# Patient Record
Sex: FEMALE | URBAN - METROPOLITAN AREA
[De-identification: names, ages, dates, MRNs, and addresses within clinical notes are randomized per-mention and may not be internally consistent; named-entity substitution may affect disease eponyms.]

---

## 2023-07-19 ENCOUNTER — OFFICE VISIT (OUTPATIENT)
Dept: URBAN - METROPOLITAN AREA CLINIC 113 | Facility: CLINIC | Age: 78
End: 2023-07-19

## 2023-07-19 NOTE — HPI-TODAY'S VISIT:
77-year-old female is referred by Dr. Kenia Emerson for cholangiocarcinoma and cirrhosis of the liver.  A copy of today's visit will be forwarded to the referring provider.  Per the referral note, she has a history of cholangiocarcinoma, hepatitis B and cirrhosis of the liver.  She was due for surveillance MRI.  Labs 2/10/2023: Normal H/H, low platelets 142, glucose 171, creatinine 1.04, GFR 55, normal LFTs, unremarkable lipid panel, normal iron panel, normal serum ferritin, elevated TSH 6.970, normal free T4.  MRI of the liver with and without contrast 7/16/2023:Within hepatic segment VIII, there is delayed enhancement with associated capsular retraction as well as distal ductal dilation.  CBD measures 11 mm without enhancement.  Findings most likely represent sequela of a chronic infectious/inflammatory process with underlying fibrotic change.  Underlying cholangiocarcinoma is not entirely excluded and correlation with outside imaging would be of benefit.  MRI of the abdomen with IV contrast is recommended in 3 to 6 months.  Suspected clot within the right portal vein supplying hepatic segment V.  Cirrhotic hepatic morphology with trace abdominopelvic ascites.  MRCP- decide on ERCP (if obstruction in biliary tree) vs EUS (if liver mass or pnacreatic - EUS) CA 19-9, CEA, AFP, repeat CBC, CMP, PT/INR

## 2023-07-21 ENCOUNTER — TELEPHONE ENCOUNTER (OUTPATIENT)
Dept: URBAN - METROPOLITAN AREA CLINIC 113 | Facility: CLINIC | Age: 78
End: 2023-07-21

## 2023-07-25 ENCOUNTER — OFFICE VISIT (OUTPATIENT)
Dept: URBAN - METROPOLITAN AREA CLINIC 113 | Facility: CLINIC | Age: 78
End: 2023-07-25
Payer: COMMERCIAL

## 2023-07-25 ENCOUNTER — TELEPHONE ENCOUNTER (OUTPATIENT)
Dept: URBAN - METROPOLITAN AREA CLINIC 113 | Facility: CLINIC | Age: 78
End: 2023-07-25

## 2023-07-25 ENCOUNTER — WEB ENCOUNTER (OUTPATIENT)
Dept: URBAN - METROPOLITAN AREA CLINIC 113 | Facility: CLINIC | Age: 78
End: 2023-07-25

## 2023-07-25 VITALS
DIASTOLIC BLOOD PRESSURE: 80 MMHG | SYSTOLIC BLOOD PRESSURE: 135 MMHG | RESPIRATION RATE: 14 BRPM | BODY MASS INDEX: 29.26 KG/M2 | HEIGHT: 62 IN | HEART RATE: 101 BPM | TEMPERATURE: 97.3 F | WEIGHT: 159 LBS

## 2023-07-25 DIAGNOSIS — K74.69 OTHER CIRRHOSIS OF LIVER: ICD-10-CM

## 2023-07-25 DIAGNOSIS — B18.1 CHRONIC VIRAL HEPATITIS B WITHOUT DELTA AGENT AND WITHOUT COMA: ICD-10-CM

## 2023-07-25 DIAGNOSIS — R93.89 ABNORMAL MRI: ICD-10-CM

## 2023-07-25 PROBLEM — 169083003: Status: ACTIVE | Noted: 2023-07-25

## 2023-07-25 PROBLEM — 19943007: Status: ACTIVE | Noted: 2023-07-25

## 2023-07-25 PROBLEM — 61977001: Status: ACTIVE | Noted: 2023-07-25

## 2023-07-25 PROCEDURE — 99204 OFFICE O/P NEW MOD 45 MIN: CPT | Performed by: STUDENT IN AN ORGANIZED HEALTH CARE EDUCATION/TRAINING PROGRAM

## 2023-07-25 RX ORDER — LINAGLIPTIN 5 MG/1
1 TABLET TABLET, FILM COATED ORAL ONCE A DAY
Status: ACTIVE | COMMUNITY

## 2023-07-25 RX ORDER — TENOFOVIR ALAFENAMIDE 25 MG/1
1 TABLET WITH FOOD TABLET ORAL ONCE A DAY
Status: ACTIVE | COMMUNITY

## 2023-07-25 RX ORDER — TRAZODONE HYDROCHLORIDE 50 MG/1
1 TABLET AT BEDTIME AS NEEDED TABLET ORAL ONCE A DAY
Status: ACTIVE | COMMUNITY

## 2023-07-25 RX ORDER — MULTIVITAMIN
1 TABLET TABLET ORAL ONCE A DAY
Status: ACTIVE | COMMUNITY

## 2023-07-25 RX ORDER — LOSARTAN POTASSIUM 50 MG/1
1 TABLET TABLET ORAL ONCE A DAY
Status: ACTIVE | COMMUNITY

## 2023-07-25 RX ORDER — NADOLOL 20 MG/1
2 TABLETS TABLET ORAL ONCE A DAY
Status: ACTIVE | COMMUNITY

## 2023-07-25 RX ORDER — GLIPIZIDE 5 MG/1
1 TABLET 30 MINUTES BEFORE BREAKFAST TABLET ORAL ONCE A DAY
Status: ACTIVE | COMMUNITY

## 2023-07-25 RX ORDER — CITALOPRAM 10 MG/1
1 TABLET TABLET, FILM COATED ORAL ONCE A DAY
Status: ACTIVE | COMMUNITY

## 2023-07-25 RX ORDER — OMEPRAZOLE 40 MG/1
1 CAPSULE 30 MINUTES BEFORE MORNING MEAL CAPSULE, DELAYED RELEASE ORAL ONCE A DAY
Status: ACTIVE | COMMUNITY

## 2023-07-25 RX ORDER — NADOLOL 20 MG/1
2 TABLETS TABLET ORAL ONCE A DAY
Qty: 180 TABLET | Refills: 1

## 2023-07-25 RX ORDER — VIT A/VIT C/VIT E/ZINC/COPPER 4296-226
AS DIRECTED CAPSULE ORAL
Status: ACTIVE | COMMUNITY

## 2023-07-25 RX ORDER — TENOFOVIR ALAFENAMIDE 25 MG/1
1 TABLET WITH FOOD TABLET ORAL ONCE A DAY
Qty: 90 TABLET | Refills: 1

## 2023-07-25 RX ORDER — SACCHAROMYCES BOULARDII 250 MG
AS DIRECTED CAPSULE ORAL
Status: ACTIVE | COMMUNITY

## 2023-07-25 NOTE — HPI-TODAY'S VISIT:
Initial visit 7/25/2023; PCP Dr. Kenia Emerson Ms. 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 MRI liver with and without contrast 7/16/2023 for history of liver tumor, hepatitis B cirrhosis: Within hepatic segment 8 there is delayed enhancement with associated capsular retraction and distal ductal dilation, findings suggested to represent a sequela of chronic infectious/inflammatory process with underlying fibrotic change however cholangiocarcinoma was not entirely excluded, there is suspected clot within the right portal vein supplying hepatic segment 5, cirrhotic hepatic morphology was seen with trace abdominal pelvic ascites.  Repeat MRI recommended in 3 to 6-month interval. Labs 7/19/2023: BUN 13, creatinine 0.92, total bilirubin 0.7, alkaline phosphatase 114, AST 31, ALT 26, A1c 8.2%. She takes Vemlidy 25mg daily for hepatitis B. Patient presents today with her , they recently moved from [facility]. She states she was diagnosed with hepatitis B approximately 20 years ago and at that time was hospitalized for what appears to be decompensated liver disease. She has been following with GI prior to moving here. She states that she developed cirrhosis from hepatitis B and has done well on tenofovir therapy. She denies encephalopathy, ascites, or variceal bleeding. She had a liver tumor that was treated with radiation and ablation and treatment was successful. She notes an EGD done > 1 years ago. She states that she had a colonoscopy 5 years ago notabble for the removal of polyps. She has remains on nadolol for primary variceal bleeding prophylaxis. She denies fevers/chills, nausea/vomiting, abdominal pain, weight loss, rectal bleeding.

## 2023-07-28 LAB
A/G RATIO: 1.5
ABSOLUTE BASOPHILS: 22
ABSOLUTE EOSINOPHILS: 110
ABSOLUTE LYMPHOCYTES: 713
ABSOLUTE MONOCYTES: 282
ABSOLUTE NEUTROPHILS: 3274
ALBUMIN: 4.3
ALKALINE PHOSPHATASE: 94
ALT (SGPT): 26
AST (SGOT): 28
BASOPHILS: 0.5
BILIRUBIN, TOTAL: 1
BUN/CREATININE RATIO: (no result)
BUN: 12
CALCIUM: 9.7
CARBON DIOXIDE, TOTAL: 23
CHLORIDE: 101
CREATININE: 0.98
EGFR: 59
EOSINOPHILS: 2.5
GLOBULIN, TOTAL: 2.8
GLUCOSE: 272
HEMATOCRIT: 41.4
HEMOGLOBIN: 13.5
HEPATITIS B CORE AB TOTAL: REACTIVE
HEPATITIS B SURFACE AB IMMUNITY, QN: <5
HEPATITIS B SURFACE ANTIGEN: REACTIVE
HEPATITIS B VIRUS DNA: NOT DETECTED
HEPATITIS B VIRUS DNA: NOT DETECTED
INR: 1.1
LYMPHOCYTES: 16.2
MCH: 33.6
MCHC: 32.6
MCV: 103
MONOCYTES: 6.4
MPV: 9.4
NEUTROPHILS: 74.4
PLATELET COUNT: 99
POTASSIUM: 4
PROTEIN, TOTAL: 7.1
PT: 11.7
RDW: 12.9
RED BLOOD CELL COUNT: 4.02
SODIUM: 137
WHITE BLOOD CELL COUNT: 4.4

## 2023-08-02 ENCOUNTER — OFFICE VISIT (OUTPATIENT)
Dept: URBAN - METROPOLITAN AREA MEDICAL CENTER 19 | Facility: MEDICAL CENTER | Age: 78
End: 2023-08-02
Payer: COMMERCIAL

## 2023-08-02 DIAGNOSIS — K31.89 ACQUIRED DEFORMITY OF DUODENUM: ICD-10-CM

## 2023-08-02 DIAGNOSIS — I85.10 ESOPH VARICE OTHER DIS: ICD-10-CM

## 2023-08-02 DIAGNOSIS — I86.4 BLEEDING GASTRIC VARICES: ICD-10-CM

## 2023-08-02 DIAGNOSIS — K74.60 ADVANCED CIRRHOSIS: ICD-10-CM

## 2023-08-02 DIAGNOSIS — K76.6 CLINICALLY SIGNIFICANT PORTAL HYPERTENSION: ICD-10-CM

## 2023-08-02 PROCEDURE — 43235 EGD DIAGNOSTIC BRUSH WASH: CPT | Performed by: STUDENT IN AN ORGANIZED HEALTH CARE EDUCATION/TRAINING PROGRAM

## 2023-08-02 RX ORDER — NADOLOL 20 MG/1
2 TABLETS TABLET ORAL ONCE A DAY
Qty: 180 TABLET | Refills: 1 | Status: ACTIVE | COMMUNITY

## 2023-08-02 RX ORDER — SACCHAROMYCES BOULARDII 250 MG
AS DIRECTED CAPSULE ORAL
Status: ACTIVE | COMMUNITY

## 2023-08-02 RX ORDER — VIT A/VIT C/VIT E/ZINC/COPPER 4296-226
AS DIRECTED CAPSULE ORAL
Status: ACTIVE | COMMUNITY

## 2023-08-02 RX ORDER — LINAGLIPTIN 5 MG/1
1 TABLET TABLET, FILM COATED ORAL ONCE A DAY
Status: ACTIVE | COMMUNITY

## 2023-08-02 RX ORDER — TRAZODONE HYDROCHLORIDE 50 MG/1
1 TABLET AT BEDTIME AS NEEDED TABLET ORAL ONCE A DAY
Status: ACTIVE | COMMUNITY

## 2023-08-02 RX ORDER — TENOFOVIR ALAFENAMIDE 25 MG/1
1 TABLET WITH FOOD TABLET ORAL ONCE A DAY
Qty: 90 TABLET | Refills: 1 | Status: ACTIVE | COMMUNITY

## 2023-08-02 RX ORDER — MULTIVITAMIN
1 TABLET TABLET ORAL ONCE A DAY
Status: ACTIVE | COMMUNITY

## 2023-08-02 RX ORDER — OMEPRAZOLE 40 MG/1
1 CAPSULE 30 MINUTES BEFORE MORNING MEAL CAPSULE, DELAYED RELEASE ORAL ONCE A DAY
Status: ACTIVE | COMMUNITY

## 2023-08-02 RX ORDER — CITALOPRAM 10 MG/1
1 TABLET TABLET, FILM COATED ORAL ONCE A DAY
Status: ACTIVE | COMMUNITY

## 2023-08-02 RX ORDER — LOSARTAN POTASSIUM 50 MG/1
1 TABLET TABLET ORAL ONCE A DAY
Status: ACTIVE | COMMUNITY

## 2023-08-02 RX ORDER — GLIPIZIDE 5 MG/1
1 TABLET 30 MINUTES BEFORE BREAKFAST TABLET ORAL ONCE A DAY
Status: ACTIVE | COMMUNITY

## 2024-01-18 ENCOUNTER — ERX REFILL RESPONSE (OUTPATIENT)
Dept: URBAN - METROPOLITAN AREA CLINIC 113 | Facility: CLINIC | Age: 79
End: 2024-01-18

## 2024-01-18 ENCOUNTER — TELEPHONE ENCOUNTER (OUTPATIENT)
Dept: URBAN - METROPOLITAN AREA CLINIC 113 | Facility: CLINIC | Age: 79
End: 2024-01-18

## 2024-01-18 RX ORDER — NADOLOL 20 MG/1
2 TABLETS TABLET ORAL ONCE A DAY
Qty: 180 TABLET | Refills: 1 | OUTPATIENT

## 2024-02-21 ENCOUNTER — OV EP (OUTPATIENT)
Dept: URBAN - METROPOLITAN AREA CLINIC 113 | Facility: CLINIC | Age: 79
End: 2024-02-21
Payer: MEDICARE

## 2024-02-21 VITALS
SYSTOLIC BLOOD PRESSURE: 119 MMHG | HEIGHT: 62 IN | RESPIRATION RATE: 20 BRPM | DIASTOLIC BLOOD PRESSURE: 72 MMHG | WEIGHT: 144.6 LBS | TEMPERATURE: 96.9 F | BODY MASS INDEX: 26.61 KG/M2 | HEART RATE: 66 BPM

## 2024-02-21 DIAGNOSIS — C22.1 CHOLANGIOCARCINOMA: ICD-10-CM

## 2024-02-21 DIAGNOSIS — I81 PORTAL VEIN THROMBOSIS: ICD-10-CM

## 2024-02-21 DIAGNOSIS — B18.1 CHRONIC VIRAL HEPATITIS B WITHOUT DELTA AGENT AND WITHOUT COMA: ICD-10-CM

## 2024-02-21 DIAGNOSIS — K74.69 OTHER CIRRHOSIS OF LIVER: ICD-10-CM

## 2024-02-21 PROBLEM — 312104005: Status: ACTIVE | Noted: 2024-02-21

## 2024-02-21 PROCEDURE — 99214 OFFICE O/P EST MOD 30 MIN: CPT | Performed by: STUDENT IN AN ORGANIZED HEALTH CARE EDUCATION/TRAINING PROGRAM

## 2024-02-21 RX ORDER — TENOFOVIR ALAFENAMIDE 25 MG/1
1 TABLET WITH FOOD TABLET ORAL ONCE A DAY
Qty: 90 TABLET | Refills: 1 | Status: ACTIVE | COMMUNITY

## 2024-02-21 RX ORDER — GLIPIZIDE 5 MG/1
1 TABLET 30 MINUTES BEFORE BREAKFAST TABLET ORAL ONCE A DAY
Status: ACTIVE | COMMUNITY

## 2024-02-21 RX ORDER — OMEPRAZOLE 40 MG/1
1 CAPSULE 30 MINUTES BEFORE MORNING MEAL CAPSULE, DELAYED RELEASE ORAL ONCE A DAY
Status: ACTIVE | COMMUNITY

## 2024-02-21 RX ORDER — VIT A/VIT C/VIT E/ZINC/COPPER 4296-226
AS DIRECTED CAPSULE ORAL
Status: ACTIVE | COMMUNITY

## 2024-02-21 RX ORDER — LOSARTAN POTASSIUM 50 MG/1
1 TABLET TABLET ORAL ONCE A DAY
Status: ACTIVE | COMMUNITY

## 2024-02-21 RX ORDER — SACCHAROMYCES BOULARDII 250 MG
AS DIRECTED CAPSULE ORAL
Status: ACTIVE | COMMUNITY

## 2024-02-21 RX ORDER — NADOLOL 20 MG/1
2 TABLETS TABLET ORAL ONCE A DAY
Qty: 180 TABLET | Refills: 1 | Status: ACTIVE | COMMUNITY

## 2024-02-21 RX ORDER — TRAZODONE HYDROCHLORIDE 50 MG/1
1 TABLET AT BEDTIME AS NEEDED TABLET ORAL ONCE A DAY
Status: ACTIVE | COMMUNITY

## 2024-02-21 RX ORDER — LINAGLIPTIN 5 MG/1
1 TABLET TABLET, FILM COATED ORAL ONCE A DAY
Status: ACTIVE | COMMUNITY

## 2024-02-21 RX ORDER — TENOFOVIR ALAFENAMIDE 25 MG/1
1 TABLET WITH FOOD TABLET ORAL ONCE A DAY
Qty: 90 TABLET | Refills: 3

## 2024-02-21 RX ORDER — CITALOPRAM 10 MG/1
1 TABLET TABLET, FILM COATED ORAL ONCE A DAY
Status: ACTIVE | COMMUNITY

## 2024-02-21 RX ORDER — MULTIVITAMIN
1 TABLET TABLET ORAL ONCE A DAY
Status: ON HOLD | COMMUNITY

## 2024-02-21 NOTE — HPI-TODAY'S VISIT:
Initial visit 7/25/2023; PCP Dr. Kenia Emerson Ms. 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 Labs 2/10/2023: WBC 5.4, hemoglobin 14.4, platelet 142, BUN 17, creatinine 1.04, sodium 142, total bilirubin 0.8, alkaline phosphatase 95, AST 25, ALT 17, albumin 4.2. MRI liver with and without contrast 7/16/2023 for history of liver tumor, hepatitis B cirrhosis: Within hepatic segment 8 there is delayed enhancement with associated capsular retraction and distal ductal dilation, findings suggested to represent a sequela of chronic infectious/inflammatory process with underlying fibrotic change however cholangiocarcinoma was not entirely excluded, there is suspected clot within the right portal vein supplying hepatic segment 5, cirrhotic hepatic morphology was seen with trace abdominal pelvic ascites.  Repeat MRI recommended in 3 to 6-month interval. Labs 7/19/2023: BUN 13, creatinine 0.92, total bilirubin 0.7, alkaline phosphatase 114, AST 31, ALT 26, A1c 8.2%. She takes Vemlidy 25mg daily for hepatitis B. Patient presents today with her , they recently moved from [facility]. She states she was diagnosed with hepatitis B approximately 20 years ago and at that time was hospitalized for what appears to be decompensated liver disease. She has been following with GI prior to moving here. She states that she developed cirrhosis from hepatitis B and has done well on tenofovir therapy. She denies encephalopathy, ascites, or variceal bleeding. She had a liver tumor that was treated with radiation and ablation and treatment was successful. She notes an EGD done > 1 years ago. She states that she had a colonoscopy 5 years ago notabble for the removal of polyps. She has remains on nadolol for primary variceal bleeding prophylaxis. She denies fevers/chills, nausea/vomiting, abdominal pain, weight loss, rectal bleeding. . Follow-up visit 2/21/2024 MRI 12/22/2023 performed for history of cholangiocarcinoma/HCC: Signal abnormality within the hepatic segment 8 favored to be scarring/fibrosis, findings stable compared to prior exam, there is similar blooming artifact suspicious for thrombosis of the right portal vein this is stable compared to prior exam, cirrhotic hepatic morphology, splenorenal shunt in the left upper quadrant appreciated, no ascites. EGD 8/2/2023 performed for variceal screening: Grade 1 esophageal varices noted in the distal esophagus, type I isolated gastric varices found in the fundus without bleeding, portal hypertensive gastropathy seen, normal-appearing duodenum. Labs 1/31/2024: BUN 13, creatinine 1.04, liver enzymes unremarkable, LDL was 135, A1c 7.3%, CA 19-9 36.4 [within normal limits]. She states that she has pain in the right flank that is worse with movment. She denies abdominal pain today. She has been adherent to her Vemlidy regimen. She denies GI bleeding, confusion, abdominal distention, diarrhea/constipation.

## 2024-02-24 LAB
HEP BE AB: REACTIVE
HEP BE AG: (no result)
HEPATITIS B CORE AB TOTAL: REACTIVE
HEPATITIS B SURFACE ANTIBODY QL: (no result)
HEPATITIS B SURFACE ANTIGEN: REACTIVE
HEPATITIS B VIRUS DNA: NOT DETECTED
HEPATITIS B VIRUS DNA: NOT DETECTED

## 2024-06-01 ENCOUNTER — LAB OUTSIDE AN ENCOUNTER (OUTPATIENT)
Dept: URBAN - METROPOLITAN AREA CLINIC 113 | Facility: CLINIC | Age: 79
End: 2024-06-01

## 2024-06-24 ENCOUNTER — TELEPHONE ENCOUNTER (OUTPATIENT)
Dept: URBAN - METROPOLITAN AREA CLINIC 113 | Facility: CLINIC | Age: 79
End: 2024-06-24

## 2024-12-27 ENCOUNTER — DASHBOARD ENCOUNTERS (OUTPATIENT)
Age: 79
End: 2024-12-27

## 2024-12-27 ENCOUNTER — OFFICE VISIT (OUTPATIENT)
Dept: URBAN - METROPOLITAN AREA CLINIC 113 | Facility: CLINIC | Age: 79
End: 2024-12-27
Payer: MEDICARE

## 2024-12-27 VITALS
TEMPERATURE: 98.1 F | WEIGHT: 147.2 LBS | RESPIRATION RATE: 16 BRPM | BODY MASS INDEX: 27.09 KG/M2 | HEIGHT: 62 IN | DIASTOLIC BLOOD PRESSURE: 78 MMHG | SYSTOLIC BLOOD PRESSURE: 108 MMHG | HEART RATE: 76 BPM

## 2024-12-27 DIAGNOSIS — I81 PORTAL VEIN THROMBOSIS: ICD-10-CM

## 2024-12-27 DIAGNOSIS — C22.1 CHOLANGIOCARCINOMA: ICD-10-CM

## 2024-12-27 DIAGNOSIS — B18.1 CHRONIC VIRAL HEPATITIS B WITHOUT DELTA AGENT AND WITHOUT COMA: ICD-10-CM

## 2024-12-27 DIAGNOSIS — K74.69 OTHER CIRRHOSIS OF LIVER: ICD-10-CM

## 2024-12-27 PROCEDURE — 99214 OFFICE O/P EST MOD 30 MIN: CPT | Performed by: INTERNAL MEDICINE

## 2024-12-27 RX ORDER — SACCHAROMYCES BOULARDII 250 MG
AS DIRECTED CAPSULE ORAL
Status: ACTIVE | COMMUNITY

## 2024-12-27 RX ORDER — NADOLOL 20 MG/1
2 TABLETS TABLET ORAL ONCE A DAY
Qty: 180 TABLET | Refills: 1 | Status: ACTIVE | COMMUNITY

## 2024-12-27 RX ORDER — LINAGLIPTIN 5 MG/1
1 TABLET TABLET, FILM COATED ORAL ONCE A DAY
Status: ACTIVE | COMMUNITY

## 2024-12-27 RX ORDER — GLIPIZIDE 5 MG/1
1 TABLET 30 MINUTES BEFORE BREAKFAST TABLET ORAL ONCE A DAY
Status: ACTIVE | COMMUNITY

## 2024-12-27 RX ORDER — TRAZODONE HYDROCHLORIDE 50 MG/1
1 TABLET AT BEDTIME AS NEEDED TABLET ORAL ONCE A DAY
Status: ACTIVE | COMMUNITY

## 2024-12-27 RX ORDER — TENOFOVIR ALAFENAMIDE 25 MG/1
1 TABLET WITH FOOD TABLET ORAL ONCE A DAY
Qty: 90 TABLET | Refills: 3 | Status: ACTIVE | COMMUNITY

## 2024-12-27 RX ORDER — MULTIVITAMIN
1 TABLET TABLET ORAL ONCE A DAY
Status: ON HOLD | COMMUNITY

## 2024-12-27 RX ORDER — LEVOTHYROXINE SODIUM 0.07 MG/1
TABLET ORAL
Qty: 90 EACH | Refills: 0 | Status: ACTIVE | COMMUNITY

## 2024-12-27 RX ORDER — VIT A/VIT C/VIT E/ZINC/COPPER 4296-226
AS DIRECTED CAPSULE ORAL
Status: ACTIVE | COMMUNITY

## 2024-12-27 RX ORDER — LOSARTAN POTASSIUM 50 MG/1
1 TABLET TABLET ORAL ONCE A DAY
Status: ACTIVE | COMMUNITY

## 2024-12-27 RX ORDER — OMEPRAZOLE 40 MG/1
1 CAPSULE 30 MINUTES BEFORE MORNING MEAL CAPSULE, DELAYED RELEASE ORAL ONCE A DAY
Status: ON HOLD | COMMUNITY

## 2024-12-27 RX ORDER — CITALOPRAM 10 MG/1
1 TABLET TABLET, FILM COATED ORAL ONCE A DAY
Status: ACTIVE | COMMUNITY

## 2024-12-27 NOTE — HPI-TODAY'S VISIT:
Initial visit 7/25/2023; PCP Dr. Kenia Emerson Ms. 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 Labs 2/10/2023: WBC 5.4, hemoglobin 14.4, platelet 142, BUN 17, creatinine 1.04, sodium 142, total bilirubin 0.8, alkaline phosphatase 95, AST 25, ALT 17, albumin 4.2. MRI liver with and without contrast 7/16/2023 for history of liver tumor, hepatitis B cirrhosis: Within hepatic segment 8 there is delayed enhancement with associated capsular retraction and distal ductal dilation, findings suggested to represent a sequela of chronic infectious/inflammatory process with underlying fibrotic change however cholangiocarcinoma was not entirely excluded, there is suspected clot within the right portal vein supplying hepatic segment 5, cirrhotic hepatic morphology was seen with trace abdominal pelvic ascites.  Repeat MRI recommended in 3 to 6-month interval. Labs 7/19/2023: BUN 13, creatinine 0.92, total bilirubin 0.7, alkaline phosphatase 114, AST 31, ALT 26, A1c 8.2%. She takes Vemlidy 25mg daily for hepatitis B. Patient presents today with her , they recently moved from [facility]. She states she was diagnosed with hepatitis B approximately 20 years ago and at that time was hospitalized for what appears to be decompensated liver disease. She has been following with GI prior to moving here. She states that she developed cirrhosis from hepatitis B and has done well on tenofovir therapy. She denies encephalopathy, ascites, or variceal bleeding. She had a liver tumor that was treated with radiation and ablation and treatment was successful. She notes an EGD done > 1 years ago. She states that she had a colonoscopy 5 years ago notabble for the removal of polyps. She has remains on nadolol for primary variceal bleeding prophylaxis. She denies fevers/chills, nausea/vomiting, abdominal pain, weight loss, rectal bleeding. . Follow-up visit 2/21/2024 MRI 12/22/2023 performed for history of cholangiocarcinoma/HCC: Signal abnormality within the hepatic segment 8 favored to be scarring/fibrosis, findings stable compared to prior exam, there is similar blooming artifact suspicious for thrombosis of the right portal vein this is stable compared to prior exam, cirrhotic hepatic morphology, splenorenal shunt in the left upper quadrant appreciated, no ascites. EGD 8/2/2023 performed for variceal screening: Grade 1 esophageal varices noted in the distal esophagus, type I isolated gastric varices found in the fundus without bleeding, portal hypertensive gastropathy seen, normal-appearing duodenum. Labs 1/31/2024: BUN 13, creatinine 1.04, liver enzymes unremarkable, LDL was 135, A1c 7.3%, CA 19-9 36.4 [within normal limits]. She states that she has pain in the right flank that is worse with movment. She denies abdominal pain today. She has been adherent to her Vemlidy regimen. She denies GI bleeding, confusion, abdominal distention, diarrhea/constipation. Interval history, 12/27/2024 Patient is a pleasant 79-year-old female who is placed in my clinic emergently due to the lack of availability of Dr. Craig Schulz at this time.  I performed extensive review of her medical records. MRI performed in June of this year revealed cirrhotic hepatic morphology with capsular retraction and delayed enhancement related to prior treatment.  There are a few areas of ductal dilatation of the with surrounding edema also likely thrombosis of the anterior branch of the right portal vein.  Perihepatic ascites. MRI November 19 with and without contrast to follow-up cholangiocarcinoma revealed once again cirrhotic hepatic morphology and largely unchanged imaging from June. Laboratory testing from December 13 of this month revealed a normal CBC except for an elevated MCV at 101, normal CMP except for glucose of 209 and creatinine of 1.1.  LFTs were normal.  Hemoglobin A1c was 6.0.  TSH was elevated at 4.9.  T4 was normal.  Last CA 19-9 performed in October 22 of this year was elevated at 40. The patient states that she has had epigastric pain coming and going for the past few months.  The pain is not related to eating or bowel movements.  It is more often when she is lifting something or standing.

## 2024-12-27 NOTE — EXAM-PHYSICAL EXAM
She is alert and oriented to person place and situation no acute distress.  There is no scleral icterus.  Abdomen is soft nondistended with point tenderness just to the left of the epigastric region which reproduces her pain.

## 2024-12-30 LAB
HEPATITIS B SURFACE ANTIGEN: REACTIVE
HEPATITIS B VIRUS DNA: NOT DETECTED
HEPATITIS B VIRUS DNA: NOT DETECTED

## 2025-02-25 ENCOUNTER — TELEPHONE ENCOUNTER (OUTPATIENT)
Dept: URBAN - METROPOLITAN AREA CLINIC 113 | Facility: CLINIC | Age: 80
End: 2025-02-25

## 2025-02-25 RX ORDER — TENOFOVIR ALAFENAMIDE 25 MG/1
1 TABLET WITH FOOD TABLET ORAL ONCE A DAY
OUTPATIENT

## 2025-03-03 ENCOUNTER — OFFICE VISIT (OUTPATIENT)
Dept: URBAN - METROPOLITAN AREA CLINIC 113 | Facility: CLINIC | Age: 80
End: 2025-03-03
Payer: MEDICARE

## 2025-03-03 ENCOUNTER — LAB OUTSIDE AN ENCOUNTER (OUTPATIENT)
Dept: URBAN - METROPOLITAN AREA CLINIC 113 | Facility: CLINIC | Age: 80
End: 2025-03-03

## 2025-03-03 VITALS
BODY MASS INDEX: 26.61 KG/M2 | WEIGHT: 144.6 LBS | SYSTOLIC BLOOD PRESSURE: 127 MMHG | RESPIRATION RATE: 18 BRPM | TEMPERATURE: 97.6 F | HEIGHT: 62 IN | HEART RATE: 71 BPM | DIASTOLIC BLOOD PRESSURE: 81 MMHG

## 2025-03-03 DIAGNOSIS — C22.1 CHOLANGIOCARCINOMA: ICD-10-CM

## 2025-03-03 DIAGNOSIS — B18.1 CHRONIC VIRAL HEPATITIS B WITHOUT DELTA AGENT AND WITHOUT COMA: ICD-10-CM

## 2025-03-03 DIAGNOSIS — R10.13 EPIGASTRIC PAIN: ICD-10-CM

## 2025-03-03 DIAGNOSIS — K74.69 OTHER CIRRHOSIS OF LIVER: ICD-10-CM

## 2025-03-03 DIAGNOSIS — I81 PORTAL VEIN THROMBOSIS: ICD-10-CM

## 2025-03-03 PROCEDURE — 99214 OFFICE O/P EST MOD 30 MIN: CPT | Performed by: STUDENT IN AN ORGANIZED HEALTH CARE EDUCATION/TRAINING PROGRAM

## 2025-03-03 RX ORDER — LEVOTHYROXINE SODIUM 0.07 MG/1
TABLET ORAL
Qty: 90 EACH | Refills: 0 | Status: ACTIVE | COMMUNITY

## 2025-03-03 RX ORDER — NADOLOL 20 MG/1
2 TABLETS TABLET ORAL ONCE A DAY
Qty: 180 TABLET | Refills: 1 | Status: ACTIVE | COMMUNITY

## 2025-03-03 RX ORDER — LOSARTAN POTASSIUM 50 MG/1
1 TABLET TABLET ORAL ONCE A DAY
Status: ACTIVE | COMMUNITY

## 2025-03-03 RX ORDER — TENOFOVIR ALAFENAMIDE 25 MG/1
1 TABLET WITH FOOD TABLET ORAL ONCE A DAY
Qty: 90 TABLET | Refills: 5 | Status: ACTIVE | COMMUNITY

## 2025-03-03 RX ORDER — MULTIVITAMIN
1 TABLET TABLET ORAL ONCE A DAY
Status: ON HOLD | COMMUNITY

## 2025-03-03 RX ORDER — CITALOPRAM 10 MG/1
1 TABLET TABLET, FILM COATED ORAL ONCE A DAY
Status: ACTIVE | COMMUNITY

## 2025-03-03 RX ORDER — TRAZODONE HYDROCHLORIDE 50 MG/1
1 TABLET AT BEDTIME AS NEEDED TABLET ORAL ONCE A DAY
Status: ACTIVE | COMMUNITY

## 2025-03-03 RX ORDER — SACCHAROMYCES BOULARDII 250 MG
AS DIRECTED CAPSULE ORAL
Status: ACTIVE | COMMUNITY

## 2025-03-03 RX ORDER — OMEPRAZOLE 40 MG/1
1 CAPSULE 30 MINUTES BEFORE MORNING MEAL CAPSULE, DELAYED RELEASE ORAL ONCE A DAY
Qty: 90 | Refills: 1 | OUTPATIENT
Start: 2025-03-03

## 2025-03-03 RX ORDER — TENOFOVIR ALAFENAMIDE 25 MG/1
1 TABLET WITH FOOD TABLET ORAL ONCE A DAY
Qty: 90 TABLET | Refills: 3

## 2025-03-03 RX ORDER — OMEPRAZOLE 40 MG/1
1 CAPSULE 30 MINUTES BEFORE MORNING MEAL CAPSULE, DELAYED RELEASE ORAL ONCE A DAY
Status: ON HOLD | COMMUNITY

## 2025-03-03 RX ORDER — VIT A/VIT C/VIT E/ZINC/COPPER 4296-226
AS DIRECTED CAPSULE ORAL
Status: ACTIVE | COMMUNITY

## 2025-03-03 RX ORDER — GLIPIZIDE 5 MG/1
1 TABLET 30 MINUTES BEFORE BREAKFAST TABLET ORAL ONCE A DAY
Status: ACTIVE | COMMUNITY

## 2025-03-03 RX ORDER — ESOMEPRAZOLE MAGNESIUM 20 MG/1
1 CAPSULE 1/2 TO 1 HOUR BEFORE MORNING MEAL CAPSULE, DELAYED RELEASE PELLETS ORAL ONCE A DAY
Status: ACTIVE | COMMUNITY

## 2025-03-03 RX ORDER — LINAGLIPTIN 5 MG/1
1 TABLET TABLET, FILM COATED ORAL ONCE A DAY
Status: ACTIVE | COMMUNITY

## 2025-03-03 NOTE — HPI-TODAY'S VISIT:
Initial visit 7/25/2023; PCP Dr. Kenia Emerson Ms. 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 Labs 2/10/2023: WBC 5.4, hemoglobin 14.4, platelet 142, BUN 17, creatinine 1.04, sodium 142, total bilirubin 0.8, alkaline phosphatase 95, AST 25, ALT 17, albumin 4.2. MRI liver with and without contrast 7/16/2023 for history of liver tumor, hepatitis B cirrhosis: Within hepatic segment 8 there is delayed enhancement with associated capsular retraction and distal ductal dilation, findings suggested to represent a sequela of chronic infectious/inflammatory process with underlying fibrotic change however cholangiocarcinoma was not entirely excluded, there is suspected clot within the right portal vein supplying hepatic segment 5, cirrhotic hepatic morphology was seen with trace abdominal pelvic ascites.  Repeat MRI recommended in 3 to 6-month interval. Labs 7/19/2023: BUN 13, creatinine 0.92, total bilirubin 0.7, alkaline phosphatase 114, AST 31, ALT 26, A1c 8.2%. She takes Vemlidy 25mg daily for hepatitis B. Patient presents today with her , they recently moved from [facility]. She states she was diagnosed with hepatitis B approximately 20 years ago and at that time was hospitalized for what appears to be decompensated liver disease. She has been following with GI prior to moving here. She states that she developed cirrhosis from hepatitis B and has done well on tenofovir therapy. She denies encephalopathy, ascites, or variceal bleeding. She had a liver tumor that was treated with radiation and ablation and treatment was successful. She notes an EGD done > 1 years ago. She states that she had a colonoscopy 5 years ago notabble for the removal of polyps. She has remains on nadolol for primary variceal bleeding prophylaxis. She denies fevers/chills, nausea/vomiting, abdominal pain, weight loss, rectal bleeding. . Follow-up visit 2/21/2024 MRI 12/22/2023 performed for history of cholangiocarcinoma/HCC: Signal abnormality within the hepatic segment 8 favored to be scarring/fibrosis, findings stable compared to prior exam, there is similar blooming artifact suspicious for thrombosis of the right portal vein this is stable compared to prior exam, cirrhotic hepatic morphology, splenorenal shunt in the left upper quadrant appreciated, no ascites. EGD 8/2/2023 performed for variceal screening: Grade 1 esophageal varices noted in the distal esophagus, type I isolated gastric varices found in the fundus without bleeding, portal hypertensive gastropathy seen, normal-appearing duodenum. Labs 1/31/2024: BUN 13, creatinine 1.04, liver enzymes unremarkable, LDL was 135, A1c 7.3%, CA 19-9 36.4 [within normal limits]. She states that she has pain in the right flank that is worse with movment. She denies abdominal pain today. She has been adherent to her Vemlidy regimen. She denies GI bleeding, confusion, abdominal distention, diarrhea/constipation. Interval history, 12/27/2024 Patient is a pleasant 79-year-old female who is placed in my clinic emergently due to the lack of availability of Dr. Craig Schulz at this time.  I performed extensive review of her medical records. MRI performed in June of this year revealed cirrhotic hepatic morphology with capsular retraction and delayed enhancement related to prior treatment.  There are a few areas of ductal dilatation of the with surrounding edema also likely thrombosis of the anterior branch of the right portal vein.  Perihepatic ascites. MRI November 19 with and without contrast to follow-up cholangiocarcinoma revealed once again cirrhotic hepatic morphology and largely unchanged imaging from June. Laboratory testing from December 13 of this month revealed a normal CBC except for an elevated MCV at 101, normal CMP except for glucose of 209 and creatinine of 1.1.  LFTs were normal.  Hemoglobin A1c was 6.0.  TSH was elevated at 4.9.  T4 was normal.  Last CA 19-9 performed in October 22 of this year was elevated at 40. The patient states that she has had epigastric pain coming and going for the past few months.  The pain is not related to eating or bowel movements.  It is more often when she is lifting something or standing. . Follow-up visit 3/3/25 Labs 12/27/2024: Hepatitis B viral DNA not detected, hepatitis B surface antigen reactive.  She presents today with her daughter. She states that she has been having a gnawing epigastric pain that is intermittent. She reports that it has been ongoing for years but her daughter states that it seems to be worse at this time. Her weight is stable. She has not lost any weight. She has a difficult time eating when the pain is more pronounced. She denies acid reflux, she denies NSAID use.

## 2025-04-11 ENCOUNTER — TELEPHONE ENCOUNTER (OUTPATIENT)
Dept: URBAN - METROPOLITAN AREA CLINIC 112 | Facility: CLINIC | Age: 80
End: 2025-04-11

## 2025-04-30 ENCOUNTER — OFFICE VISIT (OUTPATIENT)
Dept: URBAN - METROPOLITAN AREA CLINIC 113 | Facility: CLINIC | Age: 80
End: 2025-04-30

## 2025-05-12 ENCOUNTER — OFFICE VISIT (OUTPATIENT)
Dept: URBAN - METROPOLITAN AREA CLINIC 113 | Facility: CLINIC | Age: 80
End: 2025-05-12
Payer: MEDICARE

## 2025-05-12 DIAGNOSIS — C22.1 CHOLANGIOCARCINOMA: ICD-10-CM

## 2025-05-12 DIAGNOSIS — R10.13 EPIGASTRIC PAIN: ICD-10-CM

## 2025-05-12 DIAGNOSIS — B18.1 CHRONIC VIRAL HEPATITIS B WITHOUT DELTA AGENT AND WITHOUT COMA: ICD-10-CM

## 2025-05-12 DIAGNOSIS — K74.69 OTHER CIRRHOSIS OF LIVER: ICD-10-CM

## 2025-05-12 DIAGNOSIS — I81 PORTAL VEIN THROMBOSIS: ICD-10-CM

## 2025-05-12 PROCEDURE — 99213 OFFICE O/P EST LOW 20 MIN: CPT | Performed by: STUDENT IN AN ORGANIZED HEALTH CARE EDUCATION/TRAINING PROGRAM

## 2025-05-12 RX ORDER — OMEPRAZOLE 40 MG/1
1 CAPSULE 30 MINUTES BEFORE MORNING MEAL CAPSULE, DELAYED RELEASE ORAL ONCE A DAY
Status: ON HOLD | COMMUNITY

## 2025-05-12 RX ORDER — ESOMEPRAZOLE MAGNESIUM 20 MG/1
1 CAPSULE 1/2 TO 1 HOUR BEFORE MORNING MEAL CAPSULE, DELAYED RELEASE PELLETS ORAL ONCE A DAY
Status: ACTIVE | COMMUNITY

## 2025-05-12 RX ORDER — CITALOPRAM 10 MG/1
1 TABLET TABLET, FILM COATED ORAL ONCE A DAY
Status: ACTIVE | COMMUNITY

## 2025-05-12 RX ORDER — VIT A/VIT C/VIT E/ZINC/COPPER 4296-226
AS DIRECTED CAPSULE ORAL
Status: ACTIVE | COMMUNITY

## 2025-05-12 RX ORDER — LOSARTAN POTASSIUM 50 MG/1
1 TABLET TABLET ORAL ONCE A DAY
Status: ACTIVE | COMMUNITY

## 2025-05-12 RX ORDER — OMEPRAZOLE 40 MG/1
1 CAPSULE 30 MINUTES BEFORE MORNING MEAL CAPSULE, DELAYED RELEASE ORAL ONCE A DAY
Qty: 90 | Refills: 1 | Status: ACTIVE | COMMUNITY
Start: 2025-03-03

## 2025-05-12 RX ORDER — MEMANTINE 5 MG/1
TABLET ORAL
Qty: 60 EACH | Refills: 1 | Status: ACTIVE | COMMUNITY

## 2025-05-12 RX ORDER — MULTIVITAMIN
1 TABLET TABLET ORAL ONCE A DAY
Status: ON HOLD | COMMUNITY

## 2025-05-12 RX ORDER — OMEPRAZOLE 40 MG/1
1 CAPSULE 30 MINUTES BEFORE MORNING MEAL CAPSULE, DELAYED RELEASE ORAL ONCE A DAY
Qty: 90 | Refills: 1 | OUTPATIENT
Start: 2025-05-12

## 2025-05-12 RX ORDER — TRAZODONE HYDROCHLORIDE 50 MG/1
1 TABLET AT BEDTIME AS NEEDED TABLET ORAL ONCE A DAY
Status: ACTIVE | COMMUNITY

## 2025-05-12 RX ORDER — SACCHAROMYCES BOULARDII 250 MG
AS DIRECTED CAPSULE ORAL
Status: ACTIVE | COMMUNITY

## 2025-05-12 RX ORDER — GLIPIZIDE 5 MG/1
1 TABLET 30 MINUTES BEFORE BREAKFAST TABLET ORAL ONCE A DAY
Status: ACTIVE | COMMUNITY

## 2025-05-12 RX ORDER — LEVOTHYROXINE SODIUM 0.07 MG/1
TABLET ORAL
Qty: 90 EACH | Refills: 0 | Status: ACTIVE | COMMUNITY

## 2025-05-12 RX ORDER — LINAGLIPTIN 5 MG/1
1 TABLET TABLET, FILM COATED ORAL ONCE A DAY
Status: ACTIVE | COMMUNITY

## 2025-05-12 RX ORDER — NADOLOL 20 MG/1
2 TABLETS TABLET ORAL ONCE A DAY
Qty: 180 TABLET | Refills: 1 | Status: ACTIVE | COMMUNITY

## 2025-05-12 RX ORDER — TENOFOVIR ALAFENAMIDE 25 MG/1
1 TABLET WITH FOOD TABLET ORAL ONCE A DAY
Qty: 90 TABLET | Refills: 3
Start: 2025-05-12 | End: 2026-05-07

## 2025-05-12 RX ORDER — TENOFOVIR ALAFENAMIDE 25 MG/1
1 TABLET WITH FOOD TABLET ORAL ONCE A DAY
Qty: 90 TABLET | Refills: 3 | Status: ACTIVE | COMMUNITY

## 2025-05-12 NOTE — HPI-TODAY'S VISIT:
Initial visit 7/25/2023; PCP Dr. Kenia Emerson Ms. 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 Labs 2/10/2023: WBC 5.4, hemoglobin 14.4, platelet 142, BUN 17, creatinine 1.04, sodium 142, total bilirubin 0.8, alkaline phosphatase 95, AST 25, ALT 17, albumin 4.2. MRI liver with and without contrast 7/16/2023 for history of liver tumor, hepatitis B cirrhosis: Within hepatic segment 8 there is delayed enhancement with associated capsular retraction and distal ductal dilation, findings suggested to represent a sequela of chronic infectious/inflammatory process with underlying fibrotic change however cholangiocarcinoma was not entirely excluded, there is suspected clot within the right portal vein supplying hepatic segment 5, cirrhotic hepatic morphology was seen with trace abdominal pelvic ascites.  Repeat MRI recommended in 3 to 6-month interval. Labs 7/19/2023: BUN 13, creatinine 0.92, total bilirubin 0.7, alkaline phosphatase 114, AST 31, ALT 26, A1c 8.2%. She takes Vemlidy 25mg daily for hepatitis B. Patient presents today with her , they recently moved from [facility]. She states she was diagnosed with hepatitis B approximately 20 years ago and at that time was hospitalized for what appears to be decompensated liver disease. She has been following with GI prior to moving here. She states that she developed cirrhosis from hepatitis B and has done well on tenofovir therapy. She denies encephalopathy, ascites, or variceal bleeding. She had a liver tumor that was treated with radiation and ablation and treatment was successful. She notes an EGD done > 1 years ago. She states that she had a colonoscopy 5 years ago notabble for the removal of polyps. She has remains on nadolol for primary variceal bleeding prophylaxis. She denies fevers/chills, nausea/vomiting, abdominal pain, weight loss, rectal bleeding. . Follow-up visit 2/21/2024 MRI 12/22/2023 performed for history of cholangiocarcinoma/HCC: Signal abnormality within the hepatic segment 8 favored to be scarring/fibrosis, findings stable compared to prior exam, there is similar blooming artifact suspicious for thrombosis of the right portal vein this is stable compared to prior exam, cirrhotic hepatic morphology, splenorenal shunt in the left upper quadrant appreciated, no ascites. EGD 8/2/2023 performed for variceal screening: Grade 1 esophageal varices noted in the distal esophagus, type I isolated gastric varices found in the fundus without bleeding, portal hypertensive gastropathy seen, normal-appearing duodenum. Labs 1/31/2024: BUN 13, creatinine 1.04, liver enzymes unremarkable, LDL was 135, A1c 7.3%, CA 19-9 36.4 [within normal limits]. She states that she has pain in the right flank that is worse with movment. She denies abdominal pain today. She has been adherent to her Vemlidy regimen. She denies GI bleeding, confusion, abdominal distention, diarrhea/constipation. Interval history, 12/27/2024 Patient is a pleasant 79-year-old female who is placed in my clinic emergently due to the lack of availability of Dr. Craig Schulz at this time.  I performed extensive review of her medical records. MRI performed in June of this year revealed cirrhotic hepatic morphology with capsular retraction and delayed enhancement related to prior treatment.  There are a few areas of ductal dilatation of the with surrounding edema also likely thrombosis of the anterior branch of the right portal vein.  Perihepatic ascites. MRI November 19 with and without contrast to follow-up cholangiocarcinoma revealed once again cirrhotic hepatic morphology and largely unchanged imaging from June. Laboratory testing from December 13 of this month revealed a normal CBC except for an elevated MCV at 101, normal CMP except for glucose of 209 and creatinine of 1.1.  LFTs were normal.  Hemoglobin A1c was 6.0.  TSH was elevated at 4.9.  T4 was normal.  Last CA 19-9 performed in October 22 of this year was elevated at 40. The patient states that she has had epigastric pain coming and going for the past few months.  The pain is not related to eating or bowel movements.  It is more often when she is lifting something or standing. . Follow-up visit 3/3/25 Labs 12/27/2024: Hepatitis B viral DNA not detected, hepatitis B surface antigen reactive.  She presents today with her daughter. She states that she has been having a gnawing epigastric pain that is intermittent. She reports that it has been ongoing for years but her daughter states that it seems to be worse at this time. Her weight is stable. She has not lost any weight. She has a difficult time eating when the pain is more pronounced. She denies acid reflux, she denies NSAID use. . Follow up visit 5/12/25 MRI 3/7/2025 performed for surveillance of cholangiocarcinoma: Cirrhotic hepatic morphology with stable post-treatment changes, no new focal hepatic lesions. Similar gatric varices and splenorenal shunt, patent portal vein. Her epigastric pain resolved with increased omeprazole to 40mg twice a day. She has decreased her dose back down to once a day and has had no GI issues.

## 2025-06-03 ENCOUNTER — ERX REFILL RESPONSE (OUTPATIENT)
Dept: URBAN - METROPOLITAN AREA CLINIC 113 | Facility: CLINIC | Age: 80
End: 2025-06-03

## 2025-06-03 RX ORDER — OMEPRAZOLE 40 MG/1
1 CAPSULE 30 MINUTES BEFORE MORNING MEAL CAPSULE, DELAYED RELEASE ORAL ONCE A DAY
Qty: 90 | Refills: 1